# Patient Record
Sex: FEMALE | Race: WHITE | URBAN - METROPOLITAN AREA
[De-identification: names, ages, dates, MRNs, and addresses within clinical notes are randomized per-mention and may not be internally consistent; named-entity substitution may affect disease eponyms.]

---

## 2021-03-28 ENCOUNTER — HOSPITAL ENCOUNTER (OUTPATIENT)
Dept: MEDSURG UNIT | Facility: HOSPITAL | Age: 86
End: 2021-03-29
Attending: INTERNAL MEDICINE | Admitting: INTERNAL MEDICINE

## 2022-05-02 NOTE — HISTORICAL [facility] CERNER
This is a historical note converted from Debbie. Formatting and pictures may have been removed.  Please reference Debbie for original formatting and attached multimedia. Chief Complaint  presents from [facility] via [facility] for GLF with initial GCS 13. LEVEL 2 TRAUMA ACTIVATED. SEE TRAUMA FLOW SHEET.  History of Present Illness  86-year-old  female presents to the ED via EMS sent from [facility] s/p GLF with associated closed head injury. ?It is reported?patient had a fall?at the nursing home falling backwards?hitting the back of her head on?the dresser per the nursing home reports.? It is reported patient did not have any loss of consciousness. ?She is not on any blood thinners.? At baseline pt is confused with right side hemiparesis from prior CVA. Patient has had a progressive cognitive decline in function.? Past medical history includes CVA, dementia.? Patient presented to the ED as he trauma.? CT of the head without contrast revealed left frontal lobe large acute hypertensive hemorrhage surrounded by edema causing mass effect of left-to-right midline shift of 11 mm, right cerebral scattered mild subarachnoid hemorrhages, left frontal lobe and bilateral occipital lobe encephalomalacia.? CT of the cervical spine without contrast revealed no acute fracture or malalignment identified.? Neurosurgery services will consulted and evaluated the patient.? According to the daughter who is her power of  and advanced directives and living will, patient did not desire any intervention or any resuscitation.? Per family request and evaluation of neurosurgery services patient is not a surgical candidate and is desiring comfort measures at this time.? Invasive measures were stopped in the ED,. prognosis is poor.? ?Pt is admitted to hospital medicine services for comfort care.  Review of Systems  ????Except as document, all other systems reviewed and negative  Physical Exam  Vitals &  Measurements  HR:?104(Peripheral)? HR:?99(Monitored)? RR:?18? BP:?146/89? SpO2:?95%?  General:?Elderly female chronically ill-appearing?in bed resting eyes closed; family at the bedside  HEENT:?bilat pupils?reactive sluggish?oropharynx and nasal mucosal surfaces moist, no maxillary/frontal sinus tenderness to palpation  Neck:? no thyromegaly or lymphadenopathy  Respiratory:?diminished ?bilaterally?symmetrical expansion unlabored respirations?O2 saturation stable  Cardiovascular:?regular rate and rhythm S1, S2; capillary refill brisk, peripheral pulses palpable  Gastrointestinal:?soft, obese, non-tender, non-distended with normal bowel sounds, without masses to palpation  Musculoskeletal:?not following commands, generalized weakness  Integumentary: warm dry  Neurologic: drowsy, sleeping  Cognition and Speech: non-verbal at this time  Assessment/Plan  IMPRESSION:  Acute encephalopathy  Acute left frontal lobe intraparenchymal hemorrhage/right cerebral scattered mild subarachnoid hemorrhages-with mass effect left-to-right 11 millimeters shift, traumatic  Closed head injury- secondary to trauma from fall  Fall-ground level  HTN-accelerated  Dementia- chronic with cognitive decline  Hx of CVA with right side hemiparesis  DM type II  Osteoporosis  ?   PLAN:  Comfort Care measures. Prognosis is poor. PT is a DNR/DNI. Will provide comfort measures.  ?   Source of history: daughter, medical record, NH records, ED provider  Present at bedside: family  Referral source: ED  History limitation:clinical condition  ?   PCP: KHURRAM ARIAS  ?   ADVANCED DIRECTIVES:? YES  CODE STATUS: DNR/DNI  ?   ILiya, FNP, reviewed and discussed the case with Dr. MARCELA Olmedo. See addendum for further per MD.?  ?   i mary Mathisumed care of this patient at 3.10 pm  For this patient encounter I reviewed NPs documentation, treatment plan and medical decision making. ?I had a face-to-face time with this pt  86-year-old  female with past medical history of diabetes, hyperlipidemia who is a nursing home resident was brought into the ER after she had a witnessed fall, she hit her back of the head on a dresser no loss of consciousness. ?CT of the head without contrast was done that showed large left frontal lobe acute hyperdense hemorrhage surrounded by edema causing mass-effect and left-to-right midline shift of 11 mm and right cerebral scattered mild subarachnoid hemorrhages. ?CT of the C-spine negative. ?Neurosurgery was consulted patient is a DNR and DNI neurosurgery did not feel that patient is a surgery candidate and it would be against her wishes based of the documentation. ?Family was agreeable and they just want palliative care. ?She has been admitted to hospitalist service for further management and care  General awake  Chest clear to auscultate  Abdomen soft nontender  ?  Large left frontal lobe acute hemorrhage with mass-effect and left to right midline shift of 11 mm  Right cerebral subarachnoid hemorrhage  Diabetes mellitus type 2  Hyperlipidemia  ?  ?  No surgical intervention at this point as per neurosurgery since patient is DNR DNI and family is agreeable  Started on morphine and Ativan as needed  Zofran as needed for nausea and vomiting  Had a long discussion with patients daughter?and?she wants?hospice [facility] so we will consult them  ?   Reviewed DNR status  ?   Problem List/Past Medical History  PAST MEDICAL HISTORY: CVA, HTN, HLD, dementia, osteoporosis  PAST FAMILY HISTORY: Reviewed and non-significant to present medical condition  PAST SURGICAL/PROCEDURE HISTORY: endoscopy  PAST SOCIAL HISTORY: No reports of alcohol, tobacco, or illicit drug use per family; resides at [facility]  Medications  Inpatient  Ativan, 1 mg= 0.5 mL, IV Push, q4hr, PRN  Ativan, 0.5 mg= 0.25 mL, IV Push, q2hr, PRN  Ativan, 1 mg= 0.5 mL, IV Push, q5min, PRN  morphine, 2 mg= 0.5 mL, IV Push, q1hr, PRN  Phenergan, 12.5 mg= 0.5 mL,  IM, q6hr, PRN  Zofran, 4 mg= 2 mL, IV Push, q8hr, PRN  Home  Fosamax 70 mg oral tablet, 70 mg= 1 tab(s), Oral, qWeek  glipiZIDE 5 mg oral tablet, 5 mg= 1 tab(s), Oral, Daily  metformin 500 mg oral tablet, 500 mg= 1 tab(s), Oral, Daily  omeprazole 20 mg oral DR capsule  pravastatin 80 mg oral tablet, 80 mg= 1 tab(s), Oral, Daily  predniSONE 5 mg oral delayed release tablet, 5 mg= 1 tab(s), Oral, Daily  Vitamin D3 5000 intl units (125 mcg) oral capsule  Allergies  No active allergies  Immunizations  Vaccine Date Status   tetanus/diphtheria/pertussis, acel(Tdap) 03/28/2021 Given   Lab Results  Group Detail Date Value w/Units Flags Normal Range Normal Reference Text Comment Ind   Serology Rapid/Other Micro COVID-19 Rapid 3/28/2021 11:12:00 CDT NEGATIVE? ? ? ? ?   Routine Chemistry Sodium Lvl 3/28/2021 10:59:00  mmol/L ? 136-145 ? ?   Routine Chemistry Potassium Lvl 3/28/2021 10:59:00 CDT 4.1 mmol/L ? 3.5-5.1 ? ?   Routine Chemistry Chloride 3/28/2021 10:59:00  mmol/L ?  ? ?   Routine Chemistry CO2 3/28/2021 10:59:00 CDT 24 mmol/L ? 23-31 ? ?   Routine Chemistry Calcium Lvl 3/28/2021 10:59:00 CDT 8.8 mg/dL ? 8.4-10.2 ? ?   Routine Chemistry Glucose Lvl 3/28/2021 10:59:00  mg/dL HI  ? ?   Routine Chemistry BUN 3/28/2021 10:59:00 CDT 11.0 mg/dL ? 9.8-20.1 ? ?   Routine Chemistry Creatinine 3/28/2021 10:59:00 CDT 0.80 mg/dL ? 0.55-1.02 ? ?   Routine Chemistry eGFR-AA 3/28/2021 10:59:00 CDT >60? NA ? ? ?   Routine Chemistry eGFR-JAROCHO 3/28/2021 10:59:00 CDT >60 mL/min/1.73 m2 NA ? ? ?   Routine Chemistry Bili Total 3/28/2021 10:59:00 CDT 0.5 mg/dL ? ? ? ?   Routine Chemistry Bili Direct 3/28/2021 10:59:00 CDT 0.2 mg/dL ? 0.0-0.5 ? ?   Routine Chemistry Bili Indirect 3/28/2021 10:59:00 CDT 0.30 mg/dL ? 0.00-0.80 ? ?   Routine Chemistry AST 3/28/2021 10:59:00 CDT 22 unit/L ? 5-34 ? ?   Routine Chemistry ALT 3/28/2021 10:59:00 CDT 17 unit/L ? 0-55 ? ?   Routine Chemistry Alk Phos 3/28/2021  10:59:00 CDT 52 unit/L ?  ? ?   Routine Chemistry Total Protein 3/28/2021 10:59:00 CDT 6.3 gm/dL ? 5.8-7.6 ? ?   Routine Chemistry Albumin Lvl 3/28/2021 10:59:00 CDT 3.8 gm/dL ? 3.4-4.8 ? ?   Routine Chemistry Globulin 3/28/2021 10:59:00 CDT 2.5 gm/dL ? 2.4-3.5 ? ?   Routine Chemistry A/G Ratio 3/28/2021 10:59:00 CDT 1.5 ratio ? 1.1-2.0 ? ?   Coagulation PT 3/28/2021 10:59:00 CDT 12.2 second(s) ? 11.1-13.7 ? ?   Coagulation INR 3/28/2021 10:59:00 CDT 0.9? ? 0.0-1.3 ? ?   Coagulation PTT 3/28/2021 10:59:00 CDT 23.5 second(s) ? 23.2-33.7 ? ?   Hematology WBC 3/28/2021 10:59:00 CDT 9.5 x10(3)/mcL ? 4.5-11.5 ? ?   Hematology RBC 3/28/2021 10:59:00 CDT 4.45 x10(6)/mcL ? 4.20-5.40 ? ?   Hematology Hgb 3/28/2021 10:59:00 CDT 13.1 gm/dL ? 12.0-16.0 ? ?   Hematology Hct 3/28/2021 10:59:00 CDT 41.0 % ? 37.0-47.0 ? ?   Hematology Platelet 3/28/2021 10:59:00  x10(3)/mcL ? 130-400 ? ?   Hematology MCV 3/28/2021 10:59:00 CDT 92.1 fL ? 80.0-94.0 ? ?   Hematology MCH 3/28/2021 10:59:00 CDT 29.4 pg ? 27.0-31.0 ? ?   Hematology MCHC 3/28/2021 10:59:00 CDT 32.0 gm/dL LOW 33.0-36.0 ? ?   Hematology RDW 3/28/2021 10:59:00 CDT 13.0 % ? 11.5-17.0 ? ?   Hematology MPV 3/28/2021 10:59:00 CDT 13.2 fL HI 9.4-12.4 ? ?   Hematology Abs Neut 3/28/2021 10:59:00 CDT 6.59 x10(3)/mcL ? 2.10-9.20 ? ?   Hematology Neutro Auto 3/28/2021 10:59:00 CDT 70 % NA ? ? ?   Hematology Lymph Auto 3/28/2021 10:59:00 CDT 20 % NA ? ? ?   Hematology Mono Auto 3/28/2021 10:59:00 CDT 8 % NA ? ? ?   Hematology Eos Auto 3/28/2021 10:59:00 CDT 2 % NA ? ? ?   Hematology Abs Eos 3/28/2021 10:59:00 CDT 0.2 x10(3)/mcL ? 0.0-0.9 ? ?   Hematology Basophil Auto 3/28/2021 10:59:00 CDT 0 % NA ? ? ?   Hematology Abs Neutro 3/28/2021 10:59:00 CDT 6.59 x10(3)/mcL ? 2.10-9.20 ? ?   Hematology Abs Lymph 3/28/2021 10:59:00 CDT 1.8 x10(3)/mcL ? 0.6-4.6 ? ?   Hematology Abs [place] 3/28/2021 10:59:00 CDT 0.8 x10(3)/mcL ? 0.1-1.3 ? ?   Hematology Abs Baso 3/28/2021 10:59:00 CDT  0.0 x10(3)/mcL ? 0.0-0.2 ? ?   ?  ?  Diagnostic Results  Accession:?[id number]  Order:?CT Cervical Spine W/O Contrast  Report Dt/Tm:?03/28/2021 10:30  Report:?  ?  CT CERVICAL SPINE  ?  CLINICAL: ?Trauma.?  ?  TECHNIQUE: ?Sequential axial images were performed of the cervical  spine without contrast. ?Sagittal and coronal reformations performed.  ?  Dose length product was 600 mGycm. Automated exposure control was  utilized.  ?  FINDINGS: There is trace of grade 1 anterolisthesis of C4 on C5.  Otherwise, cervical vertebrae stature and alignment is preserved. ?No  acute fracture or malalignment identified. There are degenerative  changes. Prevertebral soft tissue are unremarkable. The study lacks  sensitivity to exclude intrathecal soft tissue, ligamentous or  vascular traumatic pathology. ?  ?  IMPRESSION: ?  ?  No acute fracture or malalignment identified.  ?  Accession:?[id number]  Order:?CT Head W/O Contrast  Report Dt/Tm:?03/28/2021 10:21  Report:?  ?  CLINICAL: Head injury.  ?  TECHNIQUE: Sequential axial images were performed of the brain without  contrast.?  ?  Dose product length of 1428 mGycm. Automated exposure control was  utilized to minimize radiation dose.  ?  COMPARISON: None available.?  ?  FINDINGS: There is left frontal lobe large intraparenchymal irregular  defined acute hyperdense hemorrhage with maximum anterior posterior  diameter of 5.8 cm and transverse diameter of 2.8 cm. There are  additional smaller curvilinear hyperdense hemorrhages within the  medial aspect of the left frontal lobe. These hemorrhages are  surrounded by considerable edema and result in effacement of sulci and  cause mass effect and there is left to right midline shift of  approximately 11 mm. There are right cerebral few scattered  subarachnoid hemorrhages. Encephalomalacia involves the left frontal  lobe and bilateral occipital lobes related to old infarcts. There is  chronic microvascular ischemia and atrophy.  Ventricles dilatation  appears to commensurate with the degree of atrophy. No acute depressed  skull fractures identified. Visualized paranasal sinuses are clear  without mucosal thickening, polypoidal abnormality or air-fluid  levels. Mastoid air cells aeration is optimal.?  ?  IMPRESSION:  ?  1. Left frontal lobe large acute hyperdense hemorrhages surrounded by  edema causing mass effect and left to right midline shift of 11 mm..  2. Right cerebral scattered mild subarachnoid hemorrhages.  3. Left frontal lobe and bilateral occipital lobe encephalomalacia.  ?  Findings were notified to Dr. Rainey March 28, 2021 at 1027 hours.  ?

## 2022-05-02 NOTE — HISTORICAL [facility] CERNER
This is a historical note converted from Cerlore. Formatting and pictures may have been removed.  Please reference Cerlore for original formatting and attached multimedia. Chief Complaint  presents from [facility] via [facility] for GLF with initial GCS 13. LEVEL 2 TRAUMA ACTIVATED. SEE TRAUMA FLOW SHEET.  Reason for Consultation  Ground level fall with intracranial hemorrhage  History of Present Illness  Rocio Pascual is a 86yF with PMHx dementia, diabetes, reflux, HLD, recurrent falls and brain bleeds?who presents as a level 2 trauma activation after a ground-level fall at home. ?Patient is nonverbal and as such daughter is at bedside and provides history. ?Patient reportedly has dementia and was recently placed in a nursing home due to inability to perform ADLs. ?She has had a extensive history of brain bleeds that are reportedly nontraumatic and are not related to hypertensive crisis. ?She was at nursing home where she suffered a fall and hit her head.? She was brought into the emergency room and was reportedly GCS 13 initially.? A CT scan performed in the ED revealed a left frontal lobe large acute hemorrhage causing mass-effect and left to right midline shift of 11 mm. ?Patient was started on Cleviprex drip for blood pressure. ?Vital signs were otherwise stable and labs were neurosurgery was consulted given worsening?exam and concerning neurological findings. Dr. Hudson does not believe the patient is a surgical candidate, and noted an extremely poor prognosis. ?Given the patients advanced directive indicating DNR, they have decided that they would like to make [the patient] comfortable and do not wish for aggressive measures, in accordance with her living will.?  Review of Systems  Negative except as above  Physical Exam  Vitals & Measurements  HR:?89(Peripheral)? HR:?88(Monitored)? RR:?16? BP:?148/92? SpO2:?93%?  General:?GCS?10 (4 for eyes, 5 for motor, 1 for speech)  HEENT: Abrasion/small laceration to right  occipital scalp that is hemostatic  Eye: Pupils nonreactive to light, persistent leftward gaze  Neck: C-collar in place  Respiratory:?Nonlabored breathing on room air, no retractions  Cardiovascular:?RR to radial palpation, 2+ radial pulses and 1+ DP  Gastrointestinal:?Soft, nondistended, no masses  Musculoskeletal:?Moving left upper and lower extremity spontaneously, not moving right upper and lower extremities.  Integumentary: Senile purpura, otherwise no rashes or skin changes  Neurologic: Left gaze as above, GCS 10, right hemiparesis  Assessment/Plan  Loki Pascual is an 86F with PMHx dementia, DM, recurrent falls who presents as a level?2 trauma after a ground level fall in which she hit her head, resulting in a large left frontal intraparenchymal hemorrhage. She is a not a candidate per neurosurgery, and her prognosis is poor. She has an advanced directive noting that she is DNR, and family wishes to provide comfort care.  ?  - No acute surgical intervention at this time from our perspective  - No surgical intervention per NSGY  - Will defer admission to medicine team for comfort/supportive measures  - Surgery will be available if necessary  ?  Thank you for the consult. We appreciate the opportunity to participate in the care of your patient.  ?  Addy Graham MD  Surgery Acute Care/Trauma  PGY-1  ?  Agree with above assessment and plan. Patient seen and evaluated with team.  Advanced directive and family request for DNR and comfort care. OK for medical admit to floor for comfort measures. Our team is available if the situation changes.   Problem List/Past Medical History  Ongoing  No qualifying data  Historical  No qualifying data  Medications  Inpatient  No active inpatient medications  Home  Fosamax 70 mg oral tablet, 70 mg= 1 tab(s), Oral, qWeek  glipiZIDE 5 mg oral tablet, 5 mg= 1 tab(s), Oral, Daily  metformin 500 mg oral tablet, 500 mg= 1 tab(s), Oral, Daily  omeprazole 20 mg oral   capsule  pravastatin 80 mg oral tablet, 80 mg= 1 tab(s), Oral, Daily  predniSONE 5 mg oral delayed release tablet, 5 mg= 1 tab(s), Oral, Daily  Vitamin D3 5000 intl units (125 mcg) oral capsule  Allergies  No active allergies  Social History  Unknown  Family History  Unknown  Immunizations  Vaccine Date Status   tetanus/diphtheria/pertussis, acel(Tdap) 03/28/2021 Given   Lab Results  Labs Last 24 Hours?  ?Chemistry? Hematology/Coagulation?   Sodium Lvl: 141 mmol/L (03/28/21 10:59:00) PT: 12.2 second(s) (03/28/21 10:59:00)   Potassium Lvl: 4.1 mmol/L (03/28/21 10:59:00) INR: 0.9 (03/28/21 10:59:00)   Chloride: 105 mmol/L (03/28/21 10:59:00) PTT: 23.5 second(s) (03/28/21 10:59:00)   CO2: 24 mmol/L (03/28/21 10:59:00) WBC: 9.5 x10(3)/mcL (03/28/21 10:59:00)   Calcium Lvl: 8.8 mg/dL (03/28/21 10:59:00) RBC: 4.45 x10(6)/mcL (03/28/21 10:59:00)   Glucose Lvl:?282 mg/dL?High (03/28/21 10:59:00) Hgb: 13.1 gm/dL (03/28/21 10:59:00)   BUN: 11 mg/dL (03/28/21 10:59:00) Hct: 41 % (03/28/21 10:59:00)   Creatinine: 0.8 mg/dL (03/28/21 10:59:00) Platelet: 160 x10(3)/mcL (03/28/21 10:59:00)   eGFR-AA: >60 (03/28/21 10:59:00) MCV: 92.1 fL (03/28/21 10:59:00)   eGFR-JAROCHO: >60 (03/28/21 10:59:00) MCH: 29.4 pg (03/28/21 10:59:00)   Bili Total: 0.5 mg/dL (03/28/21 10:59:00) MCHC:?32 gm/dL?Low (03/28/21 10:59:00)   Bili Direct: 0.2 mg/dL (03/28/21 10:59:00) RDW: 13 % (03/28/21 10:59:00)   Bili Indirect: 0.3 mg/dL (03/28/21 10:59:00) MPV:?13.2 fL?High (03/28/21 10:59:00)   AST: 22 unit/L (03/28/21 10:59:00) Abs Neut: 6.59 x10(3)/mcL (03/28/21 10:59:00)   ALT: 17 unit/L (03/28/21 10:59:00) Neutro Auto: 70 % (03/28/21 10:59:00)   Alk Phos: 52 unit/L (03/28/21 10:59:00) Lymph Auto: 20 % (03/28/21 10:59:00)   Total Protein: 6.3 gm/dL (03/28/21 10:59:00) Mono Auto: 8 % (03/28/21 10:59:00)   Albumin Lvl: 3.8 gm/dL (03/28/21 10:59:00) Eos Auto: 2 % (03/28/21 10:59:00)   Globulin: 2.5 gm/dL (03/28/21 10:59:00) Abs Eos: 0.2 x10(3)/mcL (03/28/21  10:59:00)   A/G Ratio: 1.5 ratio (03/28/21 10:59:00) Basophil Auto: 0 % (03/28/21 10:59:00)    Abs Neutro: 6.59 x10(3)/mcL (03/28/21 10:59:00)    Abs Lymph: 1.8 x10(3)/mcL (03/28/21 10:59:00)    Abs Mono: 0.8 x10(3)/mcL (03/28/21 10:59:00)    Abs Baso: 0 x10(3)/mcL (03/28/21 10:59:00)   Diagnostic Results  Accession:?[id number]  Order:?CT Cervical Spine W/O Contrast  Report Dt/Tm:?03/28/2021 10:30  Report:?  ?  CT CERVICAL SPINE  ?  CLINICAL: ?Trauma.?  ?  TECHNIQUE: ?Sequential axial images were performed of the cervical  spine without contrast. ?Sagittal and coronal reformations performed.  ?  Dose length product was 600 mGycm. Automated exposure control was  utilized.  ?  FINDINGS: There is trace of grade 1 anterolisthesis of C4 on C5.  Otherwise, cervical vertebrae stature and alignment is preserved. ?No  acute fracture or malalignment identified. There are degenerative  changes. Prevertebral soft tissue are unremarkable. The study lacks  sensitivity to exclude intrathecal soft tissue, ligamentous or  vascular traumatic pathology. ?  ?  IMPRESSION: ?  ?  No acute fracture or malalignment identified.  ?  Accession:?[id number]  Order:?CT Head W/O Contrast  Report Dt/Tm:?03/28/2021 10:21  Report:?  ?  CLINICAL: Head injury.  ?  TECHNIQUE: Sequential axial images were performed of the brain without  contrast.?  ?  Dose product length of 1428 mGycm. Automated exposure control was  utilized to minimize radiation dose.  ?  COMPARISON: None available.?  ?  FINDINGS: There is left frontal lobe large intraparenchymal irregular  defined acute hyperdense hemorrhage with maximum anterior posterior  diameter of 5.8 cm and transverse diameter of 2.8 cm. There are  additional smaller curvilinear hyperdense hemorrhages within the  medial aspect of the left frontal lobe. These hemorrhages are  surrounded by considerable edema and result in effacement of sulci and  cause mass effect and there is left to right midline shift  of  approximately 11 mm. There are right cerebral few scattered  subarachnoid hemorrhages. Encephalomalacia involves the left frontal  lobe and bilateral occipital lobes related to old infarcts. There is  chronic microvascular ischemia and atrophy. Ventricles dilatation  appears to commensurate with the degree of atrophy. No acute depressed  skull fractures identified. Visualized paranasal sinuses are clear  without mucosal thickening, polypoidal abnormality or air-fluid  levels. Mastoid air cells aeration is optimal.?  ?  IMPRESSION:  ?  1. Left frontal lobe large acute hyperdense hemorrhages surrounded by  edema causing mass effect and left to right midline shift of 11 mm..  2. Right cerebral scattered mild subarachnoid hemorrhages.  3. Left frontal lobe and bilateral occipital lobe encephalomalacia.  ?  Findings were notified to Dr. Rainey March 28, 2021 at 1027 hours.  ?

## 2022-05-02 NOTE — HISTORICAL [facility] CERNER
This is a historical note converted from [employer]. Formatting and pictures may have been removed.  Please reference [employer] for original formatting and attached multimedia. Admit and Discharge Dates  Admit Date: 03/28/2021  Discharge Date: 03/29/2021  Physicians  Attending Physician - Honorio MI, Cherry  Admitting Physician - Honorio MI, Cherry  Consulting Physician - Sudhakar MEYERS MD, Sami WILSON  Primary Care Physician - PCP, Clinic  Discharge Diagnosis  Acute left frontal lobe intraparenchymal hemorrhage/right cerebral scattered mild subarachnoid hemorrhages-with mass effect left-to-right 11 millimeters shift, traumatic  Closed head injury- secondary to trauma from fall  Fall-ground level  HTN-accelerated  Dementia- chronic with cognitive decline  Hx of CVA with right side hemiparesis  DM type II  Osteoporosis  ?  Hospital Course  ?  86-year-old female,?NH patient?with hx of CVA and R sided weakness, dementia sent to the ED via EMS sent from [facility] after a?GLF with associated closed head injury.??Patient had a fall?at the nursing home- ?falling backwards?hitting the back of her head on?the dresser per the nursing home reports.???In ER,?CT of the head without contrast revealed left frontal lobe large acute hypertensive hemorrhage surrounded by edema causing mass effect of left-to-right midline shift of 11 mm, right cerebral scattered mild subarachnoid hemorrhages, left frontal lobe and bilateral occipital lobe encephalomalacia.? CT of the cervical spine without contrast revealed no acute fracture or malalignment identified.? Neurosurgery services was?consulted and evaluated the patient.? According to the daughter who is her power of  and advanced directives and living will, patient did not desire any intervention or any resuscitation.? Per family request and evaluation of neurosurgery services patient is not a surgical candidate and is desiring comfort measures.?Invasive measures were stopped in the ED,.  prognosis is poor.? ?Pt was admitted to hospital medicine services for comfort care. After discussions with family, decision?has been made to transfer patient back to nursing home with?hospice care.??Case management was consulted to assist with?arrangements. ?Her prognosis is poor and death is expected.  Objective  Vitals & Measurements  T:?37.9? ?C (Oral)? TMIN:?36.4? ?C (Oral)? TMAX:?37.9? ?C (Oral)? HR:?69(Peripheral)? RR:?21? BP:?105/64? SpO2:?91%? WT:?70?kg? BMI:?66.41?  Physical Exam  General:?Elderly female, chronically ill-appearing  Respiratory:?clear to auscultation bilaterally  Cardiovascular:?regular rate and rhythm  Gastrointestinal:?soft, non-tender, non-distended with normal bowel sounds,  Patient Discharge Condition  stable  Discharge Disposition  NH with hospice   Discharge Medication Reconciliation  Discontinue  alendronate (Fosamax 70 mg oral tablet)?70 mg, Oral, qWeek  cholecalciferol (Vitamin D3 5000 intl units (125 mcg) oral capsule)  ezetimibe (ezetimibe 10 mg oral tablet)?10 mg, Oral, Daily  glipiZIDE (glipiZIDE 5 mg oral tablet)?5 mg, Oral, Daily  metFORMIN (metformin 500 mg oral tablet)?500 mg, Oral, Daily  omeprazole (omeprazole 20 mg oral DR capsule)  pravastatin (pravastatin 80 mg oral tablet)?80 mg, Oral, Daily  predniSONE (predniSONE 5 mg oral delayed release tablet)?5 mg, Oral, Daily  Education and Orders Provided  Hemorrhagic Stroke  Discharge - 03/29/21 11:23:00 CDT, Dis/Trn Another Type Inst not defined, [facility] with Hospice?  Discharge Activity - Bedrest?  Discharge Diet - Other:, npo?  Follow up  Follow up with Arranged Physician  ????Hospice MD to assume care of patient on discharge.